# Patient Record
Sex: MALE | Race: WHITE | NOT HISPANIC OR LATINO | URBAN - METROPOLITAN AREA
[De-identification: names, ages, dates, MRNs, and addresses within clinical notes are randomized per-mention and may not be internally consistent; named-entity substitution may affect disease eponyms.]

---

## 2017-11-18 ENCOUNTER — EMERGENCY (EMERGENCY)
Facility: HOSPITAL | Age: 67
LOS: 1 days | Discharge: HOME | End: 2017-11-18
Attending: EMERGENCY MEDICINE
Payer: MEDICARE

## 2017-11-18 VITALS
HEART RATE: 82 BPM | RESPIRATION RATE: 16 BRPM | TEMPERATURE: 98 F | OXYGEN SATURATION: 97 % | SYSTOLIC BLOOD PRESSURE: 142 MMHG | DIASTOLIC BLOOD PRESSURE: 74 MMHG

## 2017-11-18 VITALS — WEIGHT: 197.09 LBS | HEIGHT: 69 IN

## 2017-11-18 DIAGNOSIS — Z98.890 OTHER SPECIFIED POSTPROCEDURAL STATES: Chronic | ICD-10-CM

## 2017-11-18 LAB
ALBUMIN SERPL ELPH-MCNC: 4 G/DL — SIGNIFICANT CHANGE UP (ref 3.3–5.2)
ALP SERPL-CCNC: 47 U/L — SIGNIFICANT CHANGE UP (ref 40–120)
ALT FLD-CCNC: 49 U/L — HIGH
ANION GAP SERPL CALC-SCNC: 14 MMOL/L — SIGNIFICANT CHANGE UP (ref 5–17)
AST SERPL-CCNC: 41 U/L — HIGH
BASOPHILS # BLD AUTO: 0 K/UL — SIGNIFICANT CHANGE UP (ref 0–0.2)
BASOPHILS NFR BLD AUTO: 0.3 % — SIGNIFICANT CHANGE UP (ref 0–2)
BILIRUB SERPL-MCNC: 0.7 MG/DL — SIGNIFICANT CHANGE UP (ref 0.4–2)
BUN SERPL-MCNC: 23 MG/DL — HIGH (ref 8–20)
CALCIUM SERPL-MCNC: 8.7 MG/DL — SIGNIFICANT CHANGE UP (ref 8.6–10.2)
CHLORIDE SERPL-SCNC: 90 MMOL/L — LOW (ref 98–107)
CK SERPL-CCNC: 366 U/L — HIGH (ref 30–200)
CO2 SERPL-SCNC: 25 MMOL/L — SIGNIFICANT CHANGE UP (ref 22–29)
CREAT SERPL-MCNC: 1.29 MG/DL — SIGNIFICANT CHANGE UP (ref 0.5–1.3)
D DIMER BLD IA.RAPID-MCNC: <150 NG/ML DDU — SIGNIFICANT CHANGE UP
EOSINOPHIL # BLD AUTO: 0.1 K/UL — SIGNIFICANT CHANGE UP (ref 0–0.5)
EOSINOPHIL NFR BLD AUTO: 1.6 % — SIGNIFICANT CHANGE UP (ref 0–5)
GLUCOSE SERPL-MCNC: 114 MG/DL — SIGNIFICANT CHANGE UP (ref 70–115)
HCT VFR BLD CALC: 40.4 % — LOW (ref 42–52)
HGB BLD-MCNC: 14 G/DL — SIGNIFICANT CHANGE UP (ref 14–18)
LYMPHOCYTES # BLD AUTO: 2 K/UL — SIGNIFICANT CHANGE UP (ref 1–4.8)
LYMPHOCYTES # BLD AUTO: 33.4 % — SIGNIFICANT CHANGE UP (ref 20–55)
MCHC RBC-ENTMCNC: 28.4 PG — SIGNIFICANT CHANGE UP (ref 27–31)
MCHC RBC-ENTMCNC: 34.7 G/DL — SIGNIFICANT CHANGE UP (ref 32–36)
MCV RBC AUTO: 81.9 FL — SIGNIFICANT CHANGE UP (ref 80–94)
MONOCYTES # BLD AUTO: 0.4 K/UL — SIGNIFICANT CHANGE UP (ref 0–0.8)
MONOCYTES NFR BLD AUTO: 7.4 % — SIGNIFICANT CHANGE UP (ref 3–10)
NEUTROPHILS # BLD AUTO: 3.5 K/UL — SIGNIFICANT CHANGE UP (ref 1.8–8)
NEUTROPHILS NFR BLD AUTO: 57.1 % — SIGNIFICANT CHANGE UP (ref 37–73)
NT-PROBNP SERPL-SCNC: 15 PG/ML — SIGNIFICANT CHANGE UP (ref 0–300)
PLATELET # BLD AUTO: 133 K/UL — LOW (ref 150–400)
POTASSIUM SERPL-MCNC: 4 MMOL/L — SIGNIFICANT CHANGE UP (ref 3.5–5.3)
POTASSIUM SERPL-SCNC: 4 MMOL/L — SIGNIFICANT CHANGE UP (ref 3.5–5.3)
PROT SERPL-MCNC: 6.6 G/DL — SIGNIFICANT CHANGE UP (ref 6.6–8.7)
RBC # BLD: 4.93 M/UL — SIGNIFICANT CHANGE UP (ref 4.6–6.2)
RBC # FLD: 13.9 % — SIGNIFICANT CHANGE UP (ref 11–15.6)
SODIUM SERPL-SCNC: 129 MMOL/L — LOW (ref 135–145)
TROPONIN T SERPL-MCNC: <0.01 NG/ML — SIGNIFICANT CHANGE UP (ref 0–0.06)
WBC # BLD: 6.1 K/UL — SIGNIFICANT CHANGE UP (ref 4.8–10.8)
WBC # FLD AUTO: 6.1 K/UL — SIGNIFICANT CHANGE UP (ref 4.8–10.8)

## 2017-11-18 PROCEDURE — 82553 CREATINE MB FRACTION: CPT

## 2017-11-18 PROCEDURE — 99285 EMERGENCY DEPT VISIT HI MDM: CPT

## 2017-11-18 PROCEDURE — 93005 ELECTROCARDIOGRAM TRACING: CPT

## 2017-11-18 PROCEDURE — 80053 COMPREHEN METABOLIC PANEL: CPT

## 2017-11-18 PROCEDURE — 82550 ASSAY OF CK (CPK): CPT

## 2017-11-18 PROCEDURE — 83880 ASSAY OF NATRIURETIC PEPTIDE: CPT

## 2017-11-18 PROCEDURE — 85027 COMPLETE CBC AUTOMATED: CPT

## 2017-11-18 PROCEDURE — 84484 ASSAY OF TROPONIN QUANT: CPT

## 2017-11-18 PROCEDURE — 71046 X-RAY EXAM CHEST 2 VIEWS: CPT

## 2017-11-18 PROCEDURE — 85379 FIBRIN DEGRADATION QUANT: CPT

## 2017-11-18 PROCEDURE — 93010 ELECTROCARDIOGRAM REPORT: CPT

## 2017-11-18 PROCEDURE — 99283 EMERGENCY DEPT VISIT LOW MDM: CPT | Mod: 25

## 2017-11-18 PROCEDURE — 36415 COLL VENOUS BLD VENIPUNCTURE: CPT

## 2017-11-18 PROCEDURE — 71020: CPT | Mod: 26

## 2017-11-18 RX ORDER — SODIUM CHLORIDE 9 MG/ML
1000 INJECTION INTRAMUSCULAR; INTRAVENOUS; SUBCUTANEOUS ONCE
Qty: 0 | Refills: 0 | Status: COMPLETED | OUTPATIENT
Start: 2017-11-18 | End: 2017-11-18

## 2017-11-18 RX ADMIN — Medication 100 MILLIGRAM(S): at 17:22

## 2017-11-18 RX ADMIN — SODIUM CHLORIDE 1000 MILLILITER(S): 9 INJECTION INTRAMUSCULAR; INTRAVENOUS; SUBCUTANEOUS at 18:31

## 2017-11-18 NOTE — ED STATDOCS - OBJECTIVE STATEMENT
This pt is a 66 y/o M with past hx of HTN presenting to the ED c/o SOB and muscle cramping which worsened today. He is experiencing SOB on exertion. Pt states that he has had severe SOB, muscle cramping, nasal congestion, ear pain, cough, diaphoresis, and fever (102) for past 2 days. He currently does not have a fever, nasal congestion, or ear pain. Pt went to [facility] where he had an X ray to r/o PNA which was negative. He was prescribed Levaquin 750mg. Pt is a former smoker. He has taken Sudafed for his cough. Pt has had sick contacts when he was in [place] recently. He denies chest pain. No further complaints at this time. This pt is a 66 y/o M with past hx of HTN presenting to the ED c/o SOB and muscle cramping which worsened today. He is experiencing SOB on exertion. Pt states that he has had SOB, muscle cramping, nasal congestion, ear pain, cough, diaphoresis, and fever (102) for past 2 days. He currently does not have a fever, nasal congestion, or ear pain. Pt went to [facility] where he had an X ray to r/o PNA which was negative. He was prescribed Levaquin 750mg. Pt is a former smoker. He has taken Sudafed for his cough. Pt has had sick contacts when he was in [place] recently. He denies chest pain. No further complaints at this time.

## 2017-11-18 NOTE — ED STATDOCS - ATTENDING CONTRIBUTION TO CARE
I, Sierra Best, performed the initial face to face bedside interview with this patient regarding history of present illness, review of symptoms and relevant past medical, social and family history.  I completed an independent physical examination.  I was the initial provider who evaluated this patient.  The history, relevant review of systems, past medical and surgical history, medical decision making, and physical examination was documented by the scribe in my presence and I attest to the accuracy of the documentation.  I have signed out the follow up of any pending tests (i.e. labs, radiological studies) to the ACP.  I have communicated the patient’s plan of care and disposition with the ACP.

## 2017-11-18 NOTE — ED ADULT NURSE REASSESSMENT NOTE - NS ED NURSE REASSESS COMMENT FT1
Report received from off going RN, charting as noted. Patient A&Ox4, denies any pain or discomfort. Respirations even & unlabored, denies any numbness or tingling. IV site C/D/I, patent, negative s/s phlebitis or infiltration. Patient to be discharged, informed of plan. Family at bedside.

## 2017-11-18 NOTE — ED STATDOCS - PROGRESS NOTE DETAILS
PA NOTE: Pt seen by intake physician and hpi/orders/plan reviewed. PT presenting to ED with complaints of diaphoresis, generalized weakness and cough x 1 week.  Patient states that he had a fever x 4 days prior to onset of symptoms.  Denies fever at this time.  PE: GEN: Awake, alert,  NAD,  EYES: PERRL CARDIAC: Reg rate and rhythm, S1,S2, RRR  RESP: No distress noted. Lungs CTA bilaterally no wheeze, ronchi, rales. ABD: soft,  non-tender, no guarding. . NEURO: AOx3, no focal deficits   PLAN: ekg, labs, cxr, - if all negative, f/u with PMD

## 2017-11-18 NOTE — ED ADULT TRIAGE NOTE - CHIEF COMPLAINT QUOTE
Patient reports shortness of breath, epigastric pain, bloating. legs and finger  cramping, diaphoresis since Monday. Went to [facility] Monday, treated for bronchitis. Prescribed levaquin but stopped taking it bc it upset his stomach and malaise.

## 2017-11-18 NOTE — ED ADULT NURSE NOTE - CHIEF COMPLAINT QUOTE
Patient reports shortness of breath, epigastric pain, bloating. legs and finger  cramping, diaphoresis since Monday. Went to [facility] Monday, treated for bronchitis. Prescribed levaquin but stopped taking it bc it upset his stomach and malaise.

## 2017-11-18 NOTE — ED ADULT NURSE NOTE - OBJECTIVE STATEMENT
Pt admitted to ED c/o fever, cough and SOB x 1 wk. Pt was seen at [facility] earlier in wk and prescribed Levaquin, neg CXR. Pt states he is not getting better.

## 2017-11-18 NOTE — ED STATDOCS - MEDICAL DECISION MAKING DETAILS
Pt with SOB and diaphoresis - likely due to viral URI due to wife reporting increasing diaphoresis: Will check X-rays, EKG, labs, and give antitussive medication.

## 2019-06-28 PROBLEM — Z00.00 ENCOUNTER FOR PREVENTIVE HEALTH EXAMINATION: Status: ACTIVE | Noted: 2019-06-28

## 2020-04-07 PROBLEM — I10 ESSENTIAL (PRIMARY) HYPERTENSION: Chronic | Status: ACTIVE | Noted: 2017-11-20

## 2020-07-14 ENCOUNTER — APPOINTMENT (OUTPATIENT)
Dept: PULMONOLOGY | Facility: CLINIC | Age: 70
End: 2020-07-14

## 2021-01-26 NOTE — ED ADULT NURSE NOTE - CHPI ED SYMPTOMS POS
Osteopenia is seen    Take Os-Saul twice daily    Do weightbearing every day
DYSPNEA ON EXERTION/FEVER/CHEST CONGESTION/COUGH/SHORTNESS OF BREATH

## 2021-04-16 ENCOUNTER — APPOINTMENT (OUTPATIENT)
Dept: GASTROENTEROLOGY | Facility: CLINIC | Age: 71
End: 2021-04-16
Payer: MEDICARE

## 2021-04-16 VITALS
OXYGEN SATURATION: 97 % | DIASTOLIC BLOOD PRESSURE: 76 MMHG | TEMPERATURE: 97.9 F | HEIGHT: 62 IN | BODY MASS INDEX: 36.8 KG/M2 | HEART RATE: 78 BPM | RESPIRATION RATE: 14 BRPM | WEIGHT: 200 LBS | SYSTOLIC BLOOD PRESSURE: 130 MMHG

## 2021-04-16 DIAGNOSIS — K21.9 GASTRO-ESOPHAGEAL REFLUX DISEASE W/OUT ESOPHAGITIS: ICD-10-CM

## 2021-04-16 DIAGNOSIS — Z12.11 ENCOUNTER FOR SCREENING FOR MALIGNANT NEOPLASM OF COLON: ICD-10-CM

## 2021-04-16 PROCEDURE — 99204 OFFICE O/P NEW MOD 45 MIN: CPT

## 2021-04-16 PROCEDURE — 82272 OCCULT BLD FECES 1-3 TESTS: CPT

## 2021-04-16 NOTE — HISTORY OF PRESENT ILLNESS
[de-identified] : The patient history of hypertension and depression as well as lower back pain.  The patient has a family history of colon cancer in his father.  Mother with pancreatic cancer.  Patient denies constipation diarrhea black stools bloody stools abdominal pain or unintentional weight loss.  His last colonoscopy was normal 5 years ago as per patient.  He has no history of colon polyps.\par    Patient has a history of heartburn x20 years.  Symptoms are severe.  Worse with tomato sauce.  He takes Nexium 40 mg daily.  If he misses a dose symptoms will return quickly.  No dysphagia

## 2021-04-16 NOTE — PHYSICAL EXAM
[General Appearance - Alert] : alert [General Appearance - In No Acute Distress] : in no acute distress [General Appearance - Well Nourished] : well nourished [General Appearance - Well Developed] : well developed [Sclera] : the sclera and conjunctiva were normal [Outer Ear] : the ears and nose were normal in appearance [Neck Appearance] : the appearance of the neck was normal [Neck Cervical Mass (___cm)] : no neck mass was observed [] : no respiratory distress [Respiration, Rhythm And Depth] : normal respiratory rhythm and effort [Auscultation Breath Sounds / Voice Sounds] : lungs were clear to auscultation bilaterally [Exaggerated Use Of Accessory Muscles For Inspiration] : no accessory muscle use [Apical Impulse] : the apical impulse was normal [Heart Rate And Rhythm] : heart rate was normal and rhythm regular [Heart Sounds] : normal S1 and S2 [Bowel Sounds] : normal bowel sounds [Abdomen Soft] : soft [Abdomen Tenderness] : non-tender [Normal Sphincter Tone] : normal sphincter tone [Internal Hemorrhoid] : no internal hemorrhoids [External Hemorrhoid] : no external hemorrhoids [Occult Blood Positive] : stool was negative for occult blood [Femoral Lymph Nodes Enlarged Bilaterally] : femoral [Skin Color & Pigmentation] : normal skin color and pigmentation [Oriented To Time, Place, And Person] : oriented to person, place, and time [Impaired Insight] : insight and judgment were intact [Affect] : the affect was normal

## 2021-04-16 NOTE — ASSESSMENT
[FreeTextEntry1] : A/P\par gerd- nesium 40 mg qd\par Today's instructions for acid reflux include avoid provocative foods. For example citrus alcohol coffee chocolate mints. Smaller meals, no eating 3 hours prior to bedtime and elevate head of the bed prior to sleep.\par \par  I discussed the risks and benefits of EGD and patient was given opportunity to ask questions. EGD to r/o Mtz's  esophagus, PUD, mass, AVM'S. Pt is medically optimized for EGD\par \par family hx of colon cancer\par  I discussed the risks and benefits of colonoscopy and patient was given opportunity to ask questions. Colonoscopy to r/o colon cancer, polyps, AVM's. Patient is medically optimized for the procedure\par \par

## 2021-06-12 DIAGNOSIS — Z01.818 ENCOUNTER FOR OTHER PREPROCEDURAL EXAMINATION: ICD-10-CM

## 2021-06-13 ENCOUNTER — APPOINTMENT (OUTPATIENT)
Dept: DISASTER EMERGENCY | Facility: CLINIC | Age: 71
End: 2021-06-13

## 2021-06-13 LAB — SARS-COV-2 N GENE NPH QL NAA+PROBE: NOT DETECTED

## 2021-06-16 ENCOUNTER — OUTPATIENT (OUTPATIENT)
Dept: OUTPATIENT SERVICES | Facility: HOSPITAL | Age: 71
LOS: 1 days | End: 2021-06-16
Payer: MEDICARE

## 2021-06-16 ENCOUNTER — RESULT REVIEW (OUTPATIENT)
Age: 71
End: 2021-06-16

## 2021-06-16 ENCOUNTER — APPOINTMENT (OUTPATIENT)
Dept: GASTROENTEROLOGY | Facility: GI CENTER | Age: 71
End: 2021-06-16
Payer: MEDICARE

## 2021-06-16 DIAGNOSIS — K21.9 GASTRO-ESOPHAGEAL REFLUX DISEASE WITHOUT ESOPHAGITIS: ICD-10-CM

## 2021-06-16 DIAGNOSIS — Z98.890 OTHER SPECIFIED POSTPROCEDURAL STATES: Chronic | ICD-10-CM

## 2021-06-16 DIAGNOSIS — Z80.0 FAMILY HISTORY OF MALIGNANT NEOPLASM OF DIGESTIVE ORGANS: ICD-10-CM

## 2021-06-16 DIAGNOSIS — Z85.038 PERSONAL HISTORY OF OTHER MALIGNANT NEOPLASM OF LARGE INTESTINE: ICD-10-CM

## 2021-06-16 PROCEDURE — 43239 EGD BIOPSY SINGLE/MULTIPLE: CPT

## 2021-06-16 PROCEDURE — 43239 EGD BIOPSY SINGLE/MULTIPLE: CPT | Mod: 59

## 2021-06-16 PROCEDURE — 88305 TISSUE EXAM BY PATHOLOGIST: CPT

## 2021-06-16 PROCEDURE — G0105: CPT

## 2021-06-16 PROCEDURE — 88342 IMHCHEM/IMCYTCHM 1ST ANTB: CPT

## 2021-06-16 PROCEDURE — 45378 DIAGNOSTIC COLONOSCOPY: CPT

## 2021-06-16 PROCEDURE — 88342 IMHCHEM/IMCYTCHM 1ST ANTB: CPT | Mod: 26

## 2021-06-16 PROCEDURE — 88305 TISSUE EXAM BY PATHOLOGIST: CPT | Mod: 26

## 2021-06-16 NOTE — PHYSICAL EXAM
[General Appearance - Alert] : alert [General Appearance - In No Acute Distress] : in no acute distress [General Appearance - Well Nourished] : well nourished [General Appearance - Well Developed] : well developed [Sclera] : the sclera and conjunctiva were normal [Outer Ear] : the ears and nose were normal in appearance [Neck Appearance] : the appearance of the neck was normal [] : no respiratory distress [Respiration, Rhythm And Depth] : normal respiratory rhythm and effort [Exaggerated Use Of Accessory Muscles For Inspiration] : no accessory muscle use [Auscultation Breath Sounds / Voice Sounds] : lungs were clear to auscultation bilaterally [Apical Impulse] : the apical impulse was normal [Heart Rate And Rhythm] : heart rate was normal and rhythm regular [Bowel Sounds] : normal bowel sounds [Abdomen Soft] : soft [Abdomen Tenderness] : non-tender [Skin Color & Pigmentation] : normal skin color and pigmentation [Oriented To Time, Place, And Person] : oriented to person, place, and time [Impaired Insight] : insight and judgment were intact [Affect] : the affect was normal

## 2021-06-16 NOTE — ASSESSMENT
[FreeTextEntry1] : A/P\par gerd- gastritis, irregular zline\par Today's instructions for acid reflux include avoid provocative foods. For example citrus alcohol coffee chocolate mints. Smaller meals, no eating 3 hours prior to bedtime and elevate head of the bed prior to sleep.\par - call in one week for biopsy results \par \par \par family hx of colon polyps\par NO polyps today . Hemorrhoids\par colon in 5 years

## 2021-06-16 NOTE — PROCEDURE
[Fm Hx of Colon Ca/Polyps] : family history of colon cancer and/or polyps [Bleeding] : bleeding risk [Infection] : risk of infection [Bowel Prep Kit] : the patient took the appropriate bowel preparation kit as directed [Approved Diet Followed] : the patient avoided solid foods and adhered to the approved diet list for 24 hours prior to the procedure [Prep Qualtiy: ___] : Prep Quality:  [unfilled] [Withdrawal Time: ___] : Withdrawal Time:  [unfilled] [Left Lateral Decubitus] : The patient was positioned in the left lateral decubitus position [Cecum (Landmarks/Transillum)] : and guided to the cecum which was identified by the anatomic landmarks of the appendiceal orifice and ileocecal valve and by transillumination in the right lower quadrant [Insufflated] : insufflated [Single Pass Needed] : after a single pass [Retroflex View] : a retroflex view of the rectum was performed [Patient Rotated Into Alternating Positions] : the patient was rotated into alternating positions relative to insertion in order to maximally visualize the mucosa [No Complications] : There were no complications [With Biopsy] : with biopsy [GERD] : GERD [Procedure Explained] : The procedure was explained [Allergies Reviewed] : allergies reviewed. [Risks] : Risks [Benefits] : benefits [Alternatives] : alternatives [Consent Obtained] : written consent was obtained prior to the procedure and is detailed in the patient's record [Patient] : the patient [2] : 2 [Performed By: ___] : Performed by:  ADELFO [Normal] : Normal [Sent to Pathology] : was sent to pathology for analysis [Tolerated Well] : the patient tolerated the procedure well [Vital Signs Stable] : the vital signs were stable [Abnormal Rectum] : a normal rectum [External Hemorrhoids] : no external hemorrhoids [de-identified] :  Mild  patchy gastritis in the fundus  [de-identified] :  Mildly irregular Z line- biopsy to r/o Mtz'es esophagus  [de-identified] : Random biopsy of antrum and body to r/o HP

## 2021-06-16 NOTE — PROCEDURE
[Fm Hx of Colon Ca/Polyps] : family history of colon cancer and/or polyps [Bleeding] : bleeding risk [Infection] : risk of infection [Bowel Prep Kit] : the patient took the appropriate bowel preparation kit as directed [Approved Diet Followed] : the patient avoided solid foods and adhered to the approved diet list for 24 hours prior to the procedure [Prep Qualtiy: ___] : Prep Quality:  [unfilled] [Withdrawal Time: ___] : Withdrawal Time:  [unfilled] [Left Lateral Decubitus] : The patient was positioned in the left lateral decubitus position [Cecum (Landmarks/Transillum)] : and guided to the cecum which was identified by the anatomic landmarks of the appendiceal orifice and ileocecal valve and by transillumination in the right lower quadrant [Insufflated] : insufflated [Single Pass Needed] : after a single pass [Retroflex View] : a retroflex view of the rectum was performed [Patient Rotated Into Alternating Positions] : the patient was rotated into alternating positions relative to insertion in order to maximally visualize the mucosa [No Complications] : There were no complications [With Biopsy] : with biopsy [GERD] : GERD [Procedure Explained] : The procedure was explained [Allergies Reviewed] : allergies reviewed. [Risks] : Risks [Benefits] : benefits [Alternatives] : alternatives [Consent Obtained] : written consent was obtained prior to the procedure and is detailed in the patient's record [Patient] : the patient [2] : 2 [Performed By: ___] : Performed by:  ADELFO [Normal] : Normal [Sent to Pathology] : was sent to pathology for analysis [Tolerated Well] : the patient tolerated the procedure well [Vital Signs Stable] : the vital signs were stable [Abnormal Rectum] : a normal rectum [External Hemorrhoids] : no external hemorrhoids [de-identified] :  Mildly irregular Z line- biopsy to r/o Mtz'es esophagus  [de-identified] :  Mild  patchy gastritis in the fundus  [de-identified] : Random biopsy of antrum and body to r/o HP

## 2021-06-21 LAB — SURGICAL PATHOLOGY STUDY: SIGNIFICANT CHANGE UP

## 2021-09-17 ENCOUNTER — APPOINTMENT (OUTPATIENT)
Dept: RHEUMATOLOGY | Facility: CLINIC | Age: 71
End: 2021-09-17

## 2022-10-21 ENCOUNTER — APPOINTMENT (OUTPATIENT)
Dept: ORTHOPEDIC SURGERY | Facility: CLINIC | Age: 72
End: 2022-10-21

## 2022-10-21 VITALS — WEIGHT: 200 LBS | BODY MASS INDEX: 30.31 KG/M2 | HEIGHT: 68 IN

## 2022-10-21 DIAGNOSIS — I10 ESSENTIAL (PRIMARY) HYPERTENSION: ICD-10-CM

## 2022-10-21 DIAGNOSIS — C80.1 MALIGNANT (PRIMARY) NEOPLASM, UNSPECIFIED: ICD-10-CM

## 2022-10-21 PROCEDURE — 73030 X-RAY EXAM OF SHOULDER: CPT | Mod: LT

## 2022-10-21 PROCEDURE — J3490M: CUSTOM

## 2022-10-21 PROCEDURE — 99203 OFFICE O/P NEW LOW 30 MIN: CPT | Mod: 25

## 2022-10-21 PROCEDURE — 20611 DRAIN/INJ JOINT/BURSA W/US: CPT | Mod: LT

## 2022-10-21 NOTE — HISTORY OF PRESENT ILLNESS
[Localized] : localized [Gradual] : gradual [8] : 8 [7] : 7 [Stabbing] : stabbing [Meds] : meds [Ice] : ice [Disabled] : Work status: disabled [de-identified] : 72 year old RHD male with pain in the left shoulder, symptoms have been present for many years, went to PT pain with reaching over head, behind back and sleeping. No radicular complaints. He has tried OTC medications without much help. CSI in past has helped and had been on narcotics without help for his lower back, was a newspaper distributor, recently diagnosed with PMR sees rheum [] : no [FreeTextEntry1] : left shoulder [FreeTextEntry5] : left shoulder pain for a year and pain is getting worse, no injury [de-identified] : Dr. Mclaughlin [de-identified] : heat [de-identified] : 2020 [de-identified] : pt sees pain management

## 2022-10-21 NOTE — WORK
[Total] : total [Does not reveal pre-existing condition(s) that may affect treatment/prognosis] : does not reveal pre-existing condition(s) that may affect treatment/prognosis [Patient] : patient [No Rx restrictions] : No Rx restrictions. [I provided the services listed above] :  I provided the services listed above. [FreeTextEntry1] : poor

## 2022-10-21 NOTE — PHYSICAL EXAM
[Left] : left shoulder [Sitting] : sitting [Mild] : mild [5___] : internal rotation 5[unfilled]/5 [There are no fractures, subluxations or dislocations. No significant abnormalities are seen] : There are no fractures, subluxations or dislocations. No significant abnormalities are seen [] : no erythema [Degenerative change] : Degenerative change [FreeTextEntry1] : RCA [TWNoteComboBox7] : active forward flexion 160 degrees [TWNoteComboBox6] : internal rotation L4 [de-identified] : external rotation 50 degrees

## 2022-10-26 ENCOUNTER — APPOINTMENT (OUTPATIENT)
Dept: ORTHOPEDIC SURGERY | Facility: CLINIC | Age: 72
End: 2022-10-26

## 2022-10-26 ENCOUNTER — FORM ENCOUNTER (OUTPATIENT)
Age: 72
End: 2022-10-26

## 2022-10-26 VITALS — BODY MASS INDEX: 29.62 KG/M2 | HEIGHT: 69 IN | WEIGHT: 200 LBS

## 2022-10-26 PROCEDURE — 99214 OFFICE O/P EST MOD 30 MIN: CPT

## 2022-10-26 NOTE — PHYSICAL EXAM
[Left] : left shoulder [There are no fractures, subluxations or dislocations. No significant abnormalities are seen] : There are no fractures, subluxations or dislocations. No significant abnormalities are seen [Degenerative change] : Degenerative change [de-identified] : Constitutional: The general appearance of the patient is well developed, well nourished, no deformities and well groomed. Normal \par \par Gait: Gait and function is as follows: normal gait. \par \par Skin: Head and neck visualized skin is normal. Left upper extremity visualized skin is normal. Right upper extremity visualized skin is normal. Thoracic Skin of the thoracic spine shows visualized skin is normal. \par \par Cardiovascular: palpable radial pulse bilaterally, good capillary refill in digits of the bilateral upper extremities and no temperature or color changes in the bilateral upper extremities. \par \par Lymphatic: Normal Palpation of lymph nodes in the cervical. \par \par Neurologic: fine motor control in the bilateral upper extremities is intact. Deep Tendon Reflexes in Upper and Lower Extremities Negative Chahal's in the bilateral upper extremities. The patient is oriented to time, place and person. Sensation to light touch intact in the bilateral upper extremities. Mood and Affect is normal. \par \par Right Shoulder:  Inspection of the shoulder/upper arm is as follows: There is a full, pain-free, stable range of motion of the shoulder with normal strength and no tenderness to palpation. \par \par Left Shoulder: Inspection of the shoulder/upper arm is as follows: no scapula winging, no biceps deformity and no AC joint deformity. Palpation of the shoulder/upper arm is as follows: There is tenderness at the proximal biceps tendon. Range of motion of the shoulder is as follows: Pain with internal rotation, external rotation, abduction and forward flexion. Strength of the shoulder is as follows: Supraspinatus 4/5. External Rotation 4/5. Internal Rotation 4/5. Deltoid 5/5 Ligament Stability and Special Tests of the shoulder is as follows: Neer test is positive. Shin' test is positive. Speed's test is positive. \par \par Neck: \par Inspection / Palpation of the cervical spine is as follows: mild paracervical tenderness. Range of motion of the cervical spine is as follows: moderately decreased range of motion of the cervical spine. Stability testing for the cervical spine is as follows Stable range of motion. \par \par Back, including spine: Inspection / Palpation of the thoracic/lumbar spine is as follows: There is a full, pain free, stable range of motion of the thoracic spine with a normal tone and not tenderness to palpation..\par

## 2022-10-26 NOTE — HISTORY OF PRESENT ILLNESS
[de-identified] : 73 yo male presenting to the office c/o ongoing left shoulder pain x many years. Patient states gradually worsening pain. Pain is described as constant, severe in quality. Pain ranges 8-5/10 and non-radiating. Pain is in the anterior and lateral aspect of his shoulder. Pain intermittently radiates down his bicep as well. He was treated by Dr. King with cortisone injection last week and admits to moderate relief. He presents with x-ray for review.   \par Admits to previous arthroscopic rotator cuff repair 2010. \par PMHx: polymyalgic rheumatica \par

## 2022-10-26 NOTE — DISCUSSION/SUMMARY
[de-identified] : 71 yo male presenting to the office c/o ongoing left shoulder pain x many years. Patient states gradually worsening pain. \par He was treated by Dr. King with cortisone injection last week and admits to moderate relief.\par Left Shoulder X-rays today demonstrate Early rotator cuff tear arthropathy, no GH narrowing \par Recommended MRI of the left shoulder to further evaluate for full thickness rotator cuff tear \par Follow up 1-2 weeks for MRI review\par Pt may need Reverse TSA versus cuff tear + tenodesis \par \par Based on the patient’s history and physical exam findings, I am concerned about the possibility of a full-thickness rotator cuff tear.  The patient has pain and subjective weakness consistent with this diagnosis.  Therefore, I recommend an MRI to evaluate for a rotator cuff tear.  This will also aid in evaluating for injury to the biceps labral complex and for any signs of impingement. The patient will follow-up after MRI to discuss further treatment options.\par (1) We discussed a comprehensive treatment plans that included a prescription management plan involving the use of prescription strength medications to include Ibuprofen 600-800 mg TID, versus 500-650 mg Tylenol. We also discussed prescribing topical diclofenac (Voltaren gel) as well as once daily Meloxicam 15 mg.\par (2) The patient has More Than One chronic injuries/illnesses as outlined, discussed, and documented by ICD 10 codes listed, as well as the HPI and Plan section.\par There is a moderate risk of morbidity with further treatment, especially from use of prescription strength medications and possible side effects of these medications which include upset stomach and cardiac/renal issues with long term use were discussed.\par (3) I recommended that the patient follow-up with their medical physician to discuss any significant specific potential issues with long term use such as interactions with current medications or with exacerbation of underlying medical morbidities. \par \par Attestation:\par I, Anabelle Rubalcava , attest that this documentation has been prepared under the direction and in the presence of Provider Chau Connolly MD.\par The documentation recorded by the scribe, in my presence, accurately reflects the service I personally performed, and the decisions made by me with my edits as appropriate.\par Chau Connolly MD\par \par

## 2022-10-27 ENCOUNTER — APPOINTMENT (OUTPATIENT)
Dept: MRI IMAGING | Facility: CLINIC | Age: 72
End: 2022-10-27

## 2022-10-27 PROCEDURE — 73221 MRI JOINT UPR EXTREM W/O DYE: CPT | Mod: LT,MH

## 2022-11-02 ENCOUNTER — APPOINTMENT (OUTPATIENT)
Dept: ORTHOPEDIC SURGERY | Facility: CLINIC | Age: 72
End: 2022-11-02

## 2022-11-02 VITALS — HEIGHT: 69 IN | WEIGHT: 200 LBS | BODY MASS INDEX: 29.62 KG/M2

## 2022-11-02 DIAGNOSIS — M77.8 OTHER ENTHESOPATHIES, NOT ELSEWHERE CLASSIFIED: ICD-10-CM

## 2022-11-02 DIAGNOSIS — M19.012 PRIMARY OSTEOARTHRITIS, LEFT SHOULDER: ICD-10-CM

## 2022-11-02 DIAGNOSIS — Z98.890 OTHER SPECIFIED POSTPROCEDURAL STATES: ICD-10-CM

## 2022-11-02 PROCEDURE — 99214 OFFICE O/P EST MOD 30 MIN: CPT

## 2022-11-02 NOTE — HISTORY OF PRESENT ILLNESS
[de-identified] : 73 yo male presenting to the office c/o ongoing left shoulder pain x many years. Patient states gradually worsening pain. Pain is described as constant, severe in quality. Pain ranges 8-5/10 and non-radiating. Pain is in the anterior and lateral aspect of his shoulder. Pain intermittently radiates down his bicep as well. He was treated by Dr. King with cortisone injection last week and admits to moderate relief. He presents with x-ray for review.   \par Admits to previous arthroscopic rotator cuff repair 2010. \par PMHx: polymyalgic rheumatica \par \par 11/2/22: Patient presents for repeat evaluation of left shoulder pain. He continues to have pain with limited ROM. His pain is severe, in the lateral and anterior aspect of his shoulder. Patient presents to review MRI. \par

## 2022-11-02 NOTE — DISCUSSION/SUMMARY
[de-identified] : 71 yo male presenting to the office c/o ongoing left shoulder pain x many years. Patient states gradually worsening pain. \par He was treated by Dr. King with cortisone injection last week and admits to moderate relief.\par Left Shoulder X-rays demonstrate Early rotator cuff tear arthropathy, no GH narrowing \par \par MRI of the left shoulder demonstrates severe chronic rotator cuff tear with supraspinatus and infraspinatus muscle atrophy; superior displacement of humeral head\par Discussed with the patient ultimately he is a candidate for reverse TSA\par Patient wishes to schedule surgery as soon as possible \par \par Recommended CT scan for surgical planning\par Patient will receive medical clearance from his PCP and possible cardiology within 30 days of scheduled surgery\par Follow up pre-op appointment\par Patient is on chronic pain medication titrated by pain management \par \par \par Pt has a significant osteoarthritis, with an intact deltoid, rotator cuff insufficiency, and inflammation to the biceps labral complex with proximal biceps tendinopathy. This has been present for more than 6 months and has not improved despite more than 3 months of conservative treatment including focused HEP/therapy, anti-inflammatory medication and activity modification. There have been no injections into the shoulder within 3 months of the indicated procedure.\par The patient is having severe pain with forward flexion and/or pseudoparalysis.\par The patient is interested in pursuing surgical treatment.\par We had a lengthy discussion about the surgery as well as the recovery. We discussed the risks which include but are not limited to infection, bleeding, need for blood transfusions, failure of treatment to alleviate all pain or improve function, the risk of injury to nerves and blood vessels. There is also the risks inherent to anesthesia as well. We discussed that given the patient’s distorted anatomy as a result of arthritis, these risks are real although every attempt will be made to mitigate these at the time of surgery.\par Pt understands there is no guarantee of success, however there is a high likelihood of functional improvement and pain relief. The patient understood all this was discussed.\par \par The patient is indicated for a Left reverse shoulder arthroplasty with biceps tenodesis.\par \par * Surgery: Considering patient has failed all conservative treatment we are requesting authorization for:\par Left shoulder Reverse Shoulder Arthroplasty (CPT 25076) with Biceps Tenodesis (CPT 75742)\par Pt would like surgery as soon as possible (next 6-8 weeks)\par The patient will require a Anny Arc 2.0 brace, cryotherapy, and 12 weeks of post-operative physical therapy.\par CT scan with Alessia Protocol\par The patient will require a medical clearance, cardiology clearance\par The doctor will require the [employer] representative, 2 hours, and one assistant.\par \par (1) We discussed a comprehensive treatment plans that included a prescription management plan involving the\par use of prescription strength medications to include Ibuprofen 600-800 mg TID, versus 500-650 mg Tylenol. We\par also discussed prescribing topical diclofenac (Voltaren gel) as well as once daily Meloxicam 15 mg.\par (2) The patient has More Than One chronic injuries/illnesses as outlined, discussed, and documented by ICD 10\par codes listed, as well as the HPI and Plan section.\par There is a moderate risk of morbidity with further treatment, especially from use of prescription strength\par medications and possible side effects of these medications which include upset stomach and cardiac/renal issues\par with long term use were discussed.\par (3) I recommended that the patient follow-up with their medical physician to discuss any significant specific\par potential issues with long term use such as interactions with current medications or with exacerbation of\par underlying medical morbidities. \par  \par \par \par

## 2022-11-02 NOTE — PHYSICAL EXAM
[de-identified] : Constitutional: The general appearance of the patient is well developed, well nourished, no deformities and well groomed. Normal \par \par Gait: Gait and function is as follows: normal gait. \par \par Skin: Head and neck visualized skin is normal. Left upper extremity visualized skin is normal. Right upper extremity visualized skin is normal. Thoracic Skin of the thoracic spine shows visualized skin is normal. \par \par Cardiovascular: palpable radial pulse bilaterally, good capillary refill in digits of the bilateral upper extremities and no temperature or color changes in the bilateral upper extremities. \par \par Lymphatic: Normal Palpation of lymph nodes in the cervical. \par \par Neurologic: fine motor control in the bilateral upper extremities is intact. Deep Tendon Reflexes in Upper and Lower Extremities Negative Chahal's in the bilateral upper extremities. The patient is oriented to time, place and person. Sensation to light touch intact in the bilateral upper extremities. Mood and Affect is normal. \par \par Right Shoulder:  Inspection of the shoulder/upper arm is as follows: There is a full, pain-free, stable range of motion of the shoulder with normal strength and no tenderness to palpation. \par \par Left Shoulder: Inspection of the shoulder/upper arm is as follows: no scapula winging, no biceps deformity and no AC joint deformity. Palpation of the shoulder/upper arm is as follows: There is tenderness at the proximal biceps tendon. Range of motion of the shoulder is as follows: Pain with internal rotation, external rotation, abduction and forward flexion. Strength of the shoulder is as follows: Supraspinatus 4/5. External Rotation 4/5. Internal Rotation 4/5. Deltoid 5/5 Ligament Stability and Special Tests of the shoulder is as follows: Neer test is positive. Shin' test is positive. Speed's test is positive. \par \par Neck: \par Inspection / Palpation of the cervical spine is as follows: mild paracervical tenderness. Range of motion of the cervical spine is as follows: moderately decreased range of motion of the cervical spine. Stability testing for the cervical spine is as follows Stable range of motion. \par \par Back, including spine: Inspection / Palpation of the thoracic/lumbar spine is as follows: There is a full, pain free, stable range of motion of the thoracic spine with a normal tone and not tenderness to palpation..\par

## 2022-11-02 NOTE — ASSESSMENT
[FreeTextEntry1] : MRI Left Sh 10/27/22 [facility]: 1. Severe chronic rotator cuff tear with supraspinatus and infraspinatus muscle atrophy and superior displacement of humeral head with narrowing of the supraspinatus outlet potentially recurrent.\par 2. Glenohumeral joint arthrosis, effusion, bursitis, and AC joint arthrosis with retracted long head of the biceps \par tendon, glenohumeral joint osteophytes, and scarring with susceptibility artifact related to prior surgery in the \par region of the supraspinatus and infraspinatus musculature in the posterior superior aspect of the shoulder which \par should be correlated clinically.

## 2022-12-17 ENCOUNTER — OFFICE (OUTPATIENT)
Dept: URBAN - METROPOLITAN AREA CLINIC 105 | Facility: CLINIC | Age: 72
Setting detail: OPHTHALMOLOGY
End: 2022-12-17
Payer: MEDICARE

## 2022-12-17 ENCOUNTER — RX ONLY (RX ONLY)
Age: 72
End: 2022-12-17

## 2022-12-17 DIAGNOSIS — H16.223: ICD-10-CM

## 2022-12-17 DIAGNOSIS — H43.812: ICD-10-CM

## 2022-12-17 DIAGNOSIS — H43.391: ICD-10-CM

## 2022-12-17 DIAGNOSIS — H52.4: ICD-10-CM

## 2022-12-17 DIAGNOSIS — Z96.1: ICD-10-CM

## 2022-12-17 PROCEDURE — 92015 DETERMINE REFRACTIVE STATE: CPT | Performed by: OPTOMETRIST

## 2022-12-17 PROCEDURE — 92250 FUNDUS PHOTOGRAPHY W/I&R: CPT | Performed by: OPTOMETRIST

## 2022-12-17 PROCEDURE — 92014 COMPRE OPH EXAM EST PT 1/>: CPT | Performed by: OPTOMETRIST

## 2022-12-17 ASSESSMENT — REFRACTION_CURRENTRX
OD_OVR_VA: 20/
OS_OVR_VA: 20/
OS_SPHERE: +3.75
OD_SPHERE: +1.00
OD_AXIS: 106
OS_VPRISM_DIRECTION: SV
OS_ADD: +2.25
OD_OVR_VA: 20/
OS_OVR_VA: 20/
OD_VPRISM_DIRECTION: SV
OS_AXIS: 080
OS_SPHERE: +1.50
OD_ADD: +2.25
OD_VPRISM_DIRECTION: SV
OS_VPRISM_DIRECTION: PROGS
OS_CYLINDER: -1.50
OD_OVR_VA: 20/
OD_VPRISM_DIRECTION: PROGS
OD_CYLINDER: -2.00
OS_ADD: +1.50
OD_SPHERE: +3.25
OD_ADD: +1.50
OD_CYLINDER: -2.00
OD_AXIS: 095
OS_AXIS: 085
OS_VPRISM_DIRECTION: SV
OS_CYLINDER: -1.50
OS_OVR_VA: 20/

## 2022-12-17 ASSESSMENT — TEAR BREAK UP TIME (TBUT)
OS_TBUT: 5SEC
OD_TBUT: 5SEC

## 2022-12-17 ASSESSMENT — KERATOMETRY
OD_AXISANGLE_DEGREES: 031
OS_K2POWER_DIOPTERS: 47.50
OS_AXISANGLE_DEGREES: 086
OD_K2POWER_DIOPTERS: 46.50
OS_K1POWER_DIOPTERS: 46.50
OD_K1POWER_DIOPTERS: 46.00

## 2022-12-17 ASSESSMENT — REFRACTION_MANIFEST
OS_ADD: +2.50
OD_AXIS: 105
OD_VA1: 20/20
OS_CYLINDER: -0.50
OS_AXIS: 160
OS_VA1: 20/20
OD_SPHERE: +1.00
OS_SPHERE: +0.25
OD_ADD: +2.50
OD_CYLINDER: -1.25

## 2022-12-17 ASSESSMENT — CONFRONTATIONAL VISUAL FIELD TEST (CVF)
OD_FINDINGS: FULL
OS_FINDINGS: FULL

## 2022-12-17 ASSESSMENT — REFRACTION_AUTOREFRACTION
OS_AXIS: 164
OD_CYLINDER: -1.50
OD_AXIS: 106
OD_SPHERE: +1.00
OS_SPHERE: +0.25
OS_CYLINDER: -0.50

## 2022-12-17 ASSESSMENT — AXIALLENGTH_DERIVED
OS_AL: 22.3728
OD_AL: 22.5341
OS_AL: 22.3728
OD_AL: 22.4897

## 2022-12-17 ASSESSMENT — TONOMETRY
OD_IOP_MMHG: 13
OS_IOP_MMHG: 13

## 2022-12-17 ASSESSMENT — SPHEQUIV_DERIVED
OS_SPHEQUIV: 0
OS_SPHEQUIV: 0
OD_SPHEQUIV: 0.25
OD_SPHEQUIV: 0.375

## 2022-12-17 ASSESSMENT — VISUAL ACUITY
OS_BCVA: 20/30-1
OD_BCVA: 20/25-2

## 2024-01-17 ENCOUNTER — OFFICE (OUTPATIENT)
Dept: URBAN - METROPOLITAN AREA CLINIC 113 | Facility: CLINIC | Age: 74
Setting detail: OPHTHALMOLOGY
End: 2024-01-17
Payer: COMMERCIAL

## 2024-01-17 DIAGNOSIS — Z01.00: ICD-10-CM

## 2024-01-17 PROCEDURE — 92014 COMPRE OPH EXAM EST PT 1/>: CPT | Performed by: OPTOMETRIST

## 2024-01-17 ASSESSMENT — REFRACTION_CURRENTRX
OD_OVR_VA: 20/
OS_VPRISM_DIRECTION: PROGS
OD_CYLINDER: -2.00
OS_AXIS: 130
OD_ADD: +1.50
OD_OVR_VA: 20/
OS_OVR_VA: 20/
OD_ADD: +2.25
OS_SPHERE: +3.75
OS_ADD: +2.25
OS_VPRISM_DIRECTION: SV
OS_VPRISM_DIRECTION: SV
OD_VPRISM_DIRECTION: SV
OS_CYLINDER: -0.25
OS_OVR_VA: 20/
OD_OVR_VA: 20/
OD_CYLINDER: -1.50
OS_ADD: +1.50
OD_SPHERE: +1.25
OD_VPRISM_DIRECTION: SV
OS_SPHERE: +0.50
OD_AXIS: 085
OD_AXIS: 106
OS_CYLINDER: -1.50
OD_VPRISM_DIRECTION: PROGS
OS_OVR_VA: 20/
OD_SPHERE: +3.25
OS_AXIS: 080

## 2024-01-17 ASSESSMENT — REFRACTION_MANIFEST
OS_VA1: 20/20-
OD_VA1: 20/20
OD_CYLINDER: -1.50
OD_ADD: +2.50
OD_SPHERE: +1.25
OS_AXIS: 085
OS_ADD: +2.50
OD_AXIS: 105
OS_CYLINDER: -0.50
OS_SPHERE: +0.25

## 2024-01-17 ASSESSMENT — SPHEQUIV_DERIVED
OS_SPHEQUIV: 0.25
OD_SPHEQUIV: 0.5
OS_SPHEQUIV: 0
OD_SPHEQUIV: 0.75

## 2024-01-17 ASSESSMENT — REFRACTION_AUTOREFRACTION
OS_CYLINDER: -0.50
OD_SPHERE: +1.75
OS_AXIS: 099
OD_CYLINDER: -2.00
OS_SPHERE: +0.50
OD_AXIS: 104

## 2024-01-17 ASSESSMENT — CONFRONTATIONAL VISUAL FIELD TEST (CVF)
OD_FINDINGS: FULL
OS_FINDINGS: FULL

## 2024-04-01 ENCOUNTER — APPOINTMENT (OUTPATIENT)
Dept: PULMONOLOGY | Facility: CLINIC | Age: 74
End: 2024-04-01

## 2024-10-11 ENCOUNTER — APPOINTMENT (OUTPATIENT)
Dept: GASTROENTEROLOGY | Facility: CLINIC | Age: 74
End: 2024-10-11